# Patient Record
(demographics unavailable — no encounter records)

---

## 2024-10-08 NOTE — HISTORY OF PRESENT ILLNESS
[de-identified] : Date of Injury/Onset:1 month Mechanism of injury: NKI Have you been treated for this in the past?N Have you had surgery for this in the past?N Physical Therapy/ HEP: None OTC Medicines: NONE RX medicines: NONE Heat, Ice, Elevation: ICE, REST CSI or Gel Injections: (Indicate which) None Other Previous Treatment   NONE /Imaging/Studies Since Last Visit: NONE  Pain: At Rest: 1/10 With Activity:7 /10 Quality of symptoms: C/O LATERAL SIDED PAIN, NO SWELLING, NO REDNESS, SHARP PAIN AND NUMBNESS LEFT LEG MOST OF DAY   Affecting Sleep: YES Stiffness upon waking, lasting greater than 30mins: Yes Difficulty with stairs: Yes Difficulty getting in and out of car: Yes Sit to stand stiffness: Yes Affects walking short/long distances? Yes Home/Work/Recreation affected? Yes  Improves with: REST Worse with: WALKING, STANDING  This is not a Work-Related Injury being treated under Worker's Compensation. This is not an athletic injury occurring associated with an interscholastic or organized sports team.     Additional Information: Patient reports a history of lower back pain. Patient has a history of lumbar radiculopathy.

## 2024-10-08 NOTE — PLAN
[FreeTextEntry1] : Discussed and reviewed current medications as follows;  Zoloft- depression Patient to continue with present medications - all medications reconciled/reviewed during this visit and listed above.   Increase fluid intake. RTO 6-8 weeks for annual physical At least 45 minutes was spent with patient face to face examining and reviewing findings/results during this visit. Ample time was provided to answer any questions or address concerns to the patients satisfaction.   I, Valentin Mcmahon, attest that this documentation has been prepared under the direction and in the presence of Provider Jareth Deleon DNP  The documentation recorded by the scribe, in my presence, accurately reflects the service I personally performed, and the decisions made by me with my edits as appropriate. Jareth Deleon DNP

## 2024-10-08 NOTE — PHYSICAL EXAM
[NL (140)] : flexion 140 degrees [NL (0)] : extension 0 degrees [5___] : hamstring 5[unfilled]/5 [] : non-antalgic [Left] : left knee [All Views] : anteroposterior, lateral, skyline, and anteroposterior standing [There are no fractures, subluxations or dislocations. No significant abnormalities are seen] : There are no fractures, subluxations or dislocations. No significant abnormalities are seen [FreeTextEntry8] : Lateral patellofemoral tenderness  Lateral quad tenderness [de-identified] : Left leg step up weaker than right [FreeTextEntry9] : Joint space well maintained. Bipartite Patella. Patella tracks laterally.

## 2024-10-08 NOTE — HISTORY OF PRESENT ILLNESS
[FreeTextEntry1] : Establish care [de-identified] : Patient encounter today to establish care.  States he has been doing well.  Denies any CP, SOB or diff breathing.  No recent fever, chills, cough or cold type symptoms.  Has no other complaints at this time.

## 2024-10-08 NOTE — DISCUSSION/SUMMARY
[de-identified] : Lengthy discussion regarding options was had with the patient. Nonsurgical options including but not limited to cortisone, Prescription anti-inflammatory medications (both steroidal and non-steroidal), activity modification, non-impact exercise, maintaining a healthy BMI, bracing, and icing were reviewed. Discussed also with the patient that they could also delay any immediate treatment options, and continue to observe and self care for the discussed problem. Discussed HEP as well as Rest, Ice and elevation. Patient had ample time to ask any questions about todays visit and the diagnosis, and all questions were answered. Patient understands the plan going forward.  The patient was advised of the diagnosis. The natural history of the pathology was explained in full to the patient in layman's terms. Several different treatment options were discussed and explained in full to the patient including the risks and benefits of both surgical and non-surgical treatments. All questions and concerns were answered.  Patient was provided a prescription for Meloxicam 15mg today. Patient was instructed to take one as needed for pain. Patient was advised on medication risks associated with anti-inflammatory medications.  At this time after discussion of the options, the patient would benefit from organized Physical Therapy to increase overall functionality. The patient was provided with an Rx in office today and was instructed to attend PT for 6-8 weeks in order to increase strength and ROM. Patient agreed with this plan and will begin PT as soon as they can.  Discussed importance of non-impact exercise and muscle stretching before and after exercise. Reviewed x-rays Explained the importance of ice and rest.  Stretching exercises shown.  Continue home strengthening and stretching program consisting of non-impact exercises and ice as needed.   As, discussed with patient, the plan is for the patient to start PT continue to self-manage, these including but not limited to HEP, Ice, NSAIDs PRN and rest. Patient was instructed to f/u as needed if symptoms persist/worsen. They expressed understanding of this plan.

## 2024-12-16 NOTE — HISTORY OF PRESENT ILLNESS
[FreeTextEntry1] : annual wellness visit [de-identified] : Patient presents today for physical exam. His last PE was over a year ago and since that time he has not had any significant changes to his medical history. Denies any CP, SOB or diff breathing. Denies abdominal pain, blood in stool, or changes in bowel habits. No recent fever, chills, cough or cold type symptoms. Has no other complaints at this time.

## 2024-12-16 NOTE — PLAN
[FreeTextEntry1] : Labs Drawn by Dr. Jareth Deleon due to poor venous access.  Patient required lab testing due to conditions in their past medical history requiring periodic monitoring.  Labs were sent to [facility].  Discussed and reviewed medications with patient as follows; Zoloft- depression Patient to continue with present medications - all medications reconciled/reviewed during this visit and listed above.  Increase fluid intake.  RTO in 7-10 days for re-evaluation.   I, Valentin Mcmahon, attest that this documentation has been prepared under the direction and in the presence of Provider Jareth Deleon DNP  The documentation recorded by the scribe, in my presence, accurately reflects the service I personally performed, and the decisions made by me with my edits as appropriate. Jareth Deleon DNP

## 2025-01-08 NOTE — HISTORY OF PRESENT ILLNESS
[Home] : at home, [unfilled] , at the time of the visit. [Medical Office: ([facility])___] : at the medical office located in  [Verbal consent obtained from patient] : the patient, [unfilled] [FreeTextEntry1] : Vitamin D [de-identified] : Patient encounter today for re-evaluation of Vitamin D.  States he has been doing well.  Denies any CP, SOB or diff breathing.  No recent fever, chills, cough or cold type symptoms.  Has no other complaints at this time.